# Patient Record
Sex: MALE | Race: WHITE | Employment: FULL TIME
[De-identification: names, ages, dates, MRNs, and addresses within clinical notes are randomized per-mention and may not be internally consistent; named-entity substitution may affect disease eponyms.]

---

## 2023-05-21 ENCOUNTER — HEALTH MAINTENANCE LETTER (OUTPATIENT)
Age: 28
End: 2023-05-21

## 2024-12-17 ENCOUNTER — HOSPITAL ENCOUNTER (EMERGENCY)
Facility: CLINIC | Age: 29
Discharge: HOME | End: 2024-12-17
Attending: EMERGENCY MEDICINE
Payer: COMMERCIAL

## 2024-12-17 ENCOUNTER — APPOINTMENT (OUTPATIENT)
Dept: ULTRASOUND IMAGING | Facility: CLINIC | Age: 29
End: 2024-12-17
Attending: EMERGENCY MEDICINE
Payer: COMMERCIAL

## 2024-12-17 VITALS
SYSTOLIC BLOOD PRESSURE: 140 MMHG | DIASTOLIC BLOOD PRESSURE: 73 MMHG | WEIGHT: 245 LBS | BODY MASS INDEX: 33.18 KG/M2 | HEIGHT: 72 IN | TEMPERATURE: 97.9 F | HEART RATE: 109 BPM | RESPIRATION RATE: 16 BRPM | OXYGEN SATURATION: 97 %

## 2024-12-17 DIAGNOSIS — N50.89 TESTICULAR SWELLING, RIGHT: ICD-10-CM

## 2024-12-17 PROBLEM — J30.2 ALLERGIC RHINITIS, SEASONAL: Status: ACTIVE | Noted: 2024-12-17

## 2024-12-17 LAB
ALBUMIN UR-MCNC: 10 MG/DL
APPEARANCE UR: CLEAR
BACTERIA #/AREA URNS HPF: ABNORMAL /HPF
BILIRUB UR QL STRIP: NEGATIVE
COLOR UR AUTO: YELLOW
GLUCOSE UR STRIP-MCNC: NEGATIVE MG/DL
HGB UR QL STRIP: NEGATIVE
KETONES UR STRIP-MCNC: NEGATIVE MG/DL
LEUKOCYTE ESTERASE UR QL STRIP: NEGATIVE
MUCOUS THREADS #/AREA URNS LPF: PRESENT /LPF
NITRATE UR QL: NEGATIVE
PH UR STRIP: 6 [PH] (ref 5–7)
RBC URINE: 0 /HPF
SP GR UR STRIP: 1.03 (ref 1–1.03)
T VAGINALIS DNA SPEC QL NAA+PROBE: NOT DETECTED
UROBILINOGEN UR STRIP-MCNC: <2 MG/DL
WBC URINE: 1 /HPF

## 2024-12-17 PROCEDURE — 250N000011 HC RX IP 250 OP 636: Performed by: EMERGENCY MEDICINE

## 2024-12-17 PROCEDURE — 99285 EMERGENCY DEPT VISIT HI MDM: CPT | Mod: 25

## 2024-12-17 PROCEDURE — 76870 US EXAM SCROTUM: CPT

## 2024-12-17 PROCEDURE — 250N000013 HC RX MED GY IP 250 OP 250 PS 637: Performed by: EMERGENCY MEDICINE

## 2024-12-17 PROCEDURE — 87491 CHLMYD TRACH DNA AMP PROBE: CPT | Performed by: EMERGENCY MEDICINE

## 2024-12-17 PROCEDURE — 87661 TRICHOMONAS VAGINALIS AMPLIF: CPT | Performed by: EMERGENCY MEDICINE

## 2024-12-17 PROCEDURE — 81001 URINALYSIS AUTO W/SCOPE: CPT | Performed by: EMERGENCY MEDICINE

## 2024-12-17 PROCEDURE — 93976 VASCULAR STUDY: CPT

## 2024-12-17 RX ORDER — OXYCODONE HYDROCHLORIDE 5 MG/1
5 TABLET ORAL ONCE
Status: COMPLETED | OUTPATIENT
Start: 2024-12-17 | End: 2024-12-17

## 2024-12-17 RX ORDER — LEVOFLOXACIN 750 MG/1
750 TABLET, FILM COATED ORAL ONCE
Status: COMPLETED | OUTPATIENT
Start: 2024-12-17 | End: 2024-12-17

## 2024-12-17 RX ORDER — IBUPROFEN 600 MG/1
600 TABLET, FILM COATED ORAL ONCE
Status: COMPLETED | OUTPATIENT
Start: 2024-12-17 | End: 2024-12-17

## 2024-12-17 RX ORDER — LEVOFLOXACIN 750 MG/1
750 TABLET, FILM COATED ORAL DAILY
Qty: 10 TABLET | Refills: 0 | Status: SHIPPED | OUTPATIENT
Start: 2024-12-17 | End: 2024-12-27

## 2024-12-17 RX ORDER — ONDANSETRON 4 MG/1
4 TABLET, ORALLY DISINTEGRATING ORAL EVERY 6 HOURS PRN
Qty: 10 TABLET | Refills: 0 | Status: SHIPPED | OUTPATIENT
Start: 2024-12-17

## 2024-12-17 RX ORDER — ONDANSETRON 4 MG/1
4 TABLET, ORALLY DISINTEGRATING ORAL ONCE
Status: COMPLETED | OUTPATIENT
Start: 2024-12-17 | End: 2024-12-17

## 2024-12-17 RX ADMIN — IBUPROFEN 600 MG: 600 TABLET ORAL at 14:17

## 2024-12-17 RX ADMIN — ONDANSETRON 4 MG: 4 TABLET, ORALLY DISINTEGRATING ORAL at 14:16

## 2024-12-17 RX ADMIN — LEVOFLOXACIN 750 MG: 750 TABLET, FILM COATED ORAL at 18:12

## 2024-12-17 RX ADMIN — OXYCODONE 5 MG: 5 TABLET ORAL at 16:24

## 2024-12-17 ASSESSMENT — ACTIVITIES OF DAILY LIVING (ADL): ADLS_ACUITY_SCORE: 41

## 2024-12-17 ASSESSMENT — COLUMBIA-SUICIDE SEVERITY RATING SCALE - C-SSRS
1. IN THE PAST MONTH, HAVE YOU WISHED YOU WERE DEAD OR WISHED YOU COULD GO TO SLEEP AND NOT WAKE UP?: NO
2. HAVE YOU ACTUALLY HAD ANY THOUGHTS OF KILLING YOURSELF IN THE PAST MONTH?: NO
6. HAVE YOU EVER DONE ANYTHING, STARTED TO DO ANYTHING, OR PREPARED TO DO ANYTHING TO END YOUR LIFE?: NO

## 2024-12-17 NOTE — ED PROVIDER NOTES
EMERGENCY DEPARTMENT ENCOUNTER      NAME: Steve Santana  AGE: 29 year old male  YOB: 1995  MRN: [id number]  EVALUATION DATE & TIME: No admission date for patient encounter.    PCP: Angel Kaur    ED PROVIDER: Isaías Maloney M.D.      Chief Complaint   Patient presents with    Groin Swelling         FINAL IMPRESSION:  1. Testicular swelling, right          ED COURSE & MEDICAL DECISION MAKING:    Pertinent Labs & Imaging studies reviewed below.  All EKGs below represent my independent interpretation.   ED Course as of 12/17/24 2243   Tue Dec 17, 2024   1446 Patient is otherwise healthy 29-year-old gentleman here with wife, brought in for tenderness, swelling in the right testicle without trauma for the past 4 days.  He is currently intermittently nauseous.  Denies any concern for STI, no discharge or rash.  On exam he has swollen, tender but not erythematous right testicle.  He is intermittently nauseous throughout the exam.  Concern for torsion, infection and ultrasound was ordered.  Motrin, Zofran ordered.  UA STI screening also ordered   1507 US Testicular & Scrotum w Doppler Ltd  Enlarged and heterogeneous right testicle containing complex intratesticular collections that measure up to 4.5 cm. Appearance is worrisome for cystic and solid neoplasm, less likely orchitis and abscess given the minimal hyperemia. Recommend urology consultation.   1521 Urology to be paged   1521 UA with Microscopic reflex to Culture(!)  Not concerning for UTI   1615 We have been trying to get a hold of the [place] [facility] for the past hour, but unfortunately the answering service is not able to be contacted and our Donna remains on hold for almost an hour.   1717 We were able to get through and I spoke to Dr. Loza.  We discussed his clinical presentation, ultrasound findings. Unable to clearly differentiate between abscess and neoplasm (although sudden onset of swelling and pain is more concerning for  infection clinically), but he will see the pt in [place] tomorrow. OK with levaquin for the time being. Pt was given the first dose here.      Additional ED Course timestamps entered by medical scribe:  2:00 PM I met with the patient for an initial encounter and evaluation.  3:23 PM I rechecked and updated the patient on  UA results.     Medical Decision Making  Obtained supplemental history:Supplemental history obtained?: Documented in chart  Reviewed external records: External records reviewed?: No  Care impacted by chronic illness:Documented in Chart  Care significantly affected by social determinants of health:N/A  Did you consider but not order tests?: Work up considered but not performed and documented in chart, if applicable  Did you interpret images independently?: Independent interpretation of ECG and images noted in documentation, when applicable.  Consultation discussion with other provider: Phone conversation with consultants will be documented in the ED Course  Discharge. I prescribed additional prescription strength medication(s) as charted. I considered admission, but discharged patient after significant clinical improvement.    MIPS Criteria Documentation: Not Applicable    MEDICATIONS GIVEN IN THE EMERGENCY:  Medications   ibuprofen (ADVIL/MOTRIN) tablet 600 mg (600 mg Oral $Given 12/17/24 1417)   ondansetron (ZOFRAN ODT) ODT tab 4 mg (4 mg Oral $Given 12/17/24 1416)   oxyCODONE (ROXICODONE) tablet 5 mg (5 mg Oral $Given 12/17/24 1624)   levofloxacin (LEVAQUIN) tablet 750 mg (750 mg Oral $Given 12/17/24 1812)         NEW PRESCRIPTIONS STARTED AT TODAY'S ER VISIT  Discharge Medication List as of 12/17/2024  6:05 PM        START taking these medications    Details   levofloxacin (LEVAQUIN) 750 MG tablet Take 1 tablet (750 mg) by mouth daily for 10 days., Disp-10 tablet, R-0, E-Prescribe      ondansetron (ZOFRAN ODT) 4 MG ODT tab Take 1 tablet (4 mg) by mouth every 6 hours as needed for nausea.,  "Disp-10 tablet, R-0, E-Prescribe                =================================================================    HPI    Steve D Max is a 29 year old male who presents to this ED for evaluation of right testicle \"soreness\" which abruptly occurred on 12/13/24 (~4 days ago). He developed swelling the next day and his pain worsened since. He now endorses nausea, vomiting, and lower abdominal pelvic pain. Patient denies history of testicular changes, recent trauma, penile rash or discharge, or unprotected sex. No other medical concerns are expressed at this time.     VITALS:  BP (!) 140/73   Pulse 109   Temp 97.9  F (36.6  C) (Oral)   Resp 16   Ht 1.829 m (6')   Wt 111.1 kg (245 lb)   SpO2 97%   BMI 33.23 kg/m      PHYSICAL EXAM    Constitutional: Well developed, well nourished. Uncomfortable appearing.  HENT: Normocephalic, atraumatic, mucous membranes moist, nose normal  Eyes: PERRL, EOMI, conjunctiva normal, no discharge.  Neck- Supple, gross ROM intact.   Respiratory: Normal work of breathing, normal rate, speaks in full sentences  Cardiovascular: Normal heart rate  Musculoskeletal: Moving all 4 extremities intentionally and without pain.  : Enlarged tender testicle on right, mildly erythematous but not warm.  Neurologic: Alert & oriented x 3, cranial nerves grossly intact. Normal gross coordination  Psychiatric: Affect normal, cooperative.      I, Fan Laron am serving as a scribe to document services personally performed by Dr. Isaías Maloney based on my observation and the provider's statements to me. I, Isaías Maloney MD attest that Fan Larry is acting in a scribe capacity, has observed my performance of the services and has documented them in accordance with my direction.    Isaías Maloney M.D.  Emergency Medicine  [facility] EMERGENCY ROOM  [address] [address] [phone]  Dept: " [phone]       Isaías Maloney MD  12/17/24 [id number]

## 2024-12-17 NOTE — ED TRIAGE NOTES
pt has had right testicular swelling since last Friday.  Vomiting started today.  Denies injury

## 2024-12-18 LAB
C TRACH DNA SPEC QL PROBE+SIG AMP: NEGATIVE
N GONORRHOEA DNA SPEC QL NAA+PROBE: NEGATIVE

## 2024-12-18 NOTE — DISCHARGE INSTRUCTIONS
Based on the ultrasound, it is not clear if this is an infection or abscess in the testicle, or cancer.  I know that is difficult to not have any certainty at this point, but my initial impression is that this is more likely to be an infection.  Dr. Loza will help further clarify this tomorrow in clinic.    The urologist Dr. Loza will have somebody call you tomorrow to fit you into his clinic in [place].    Please take your next dose of Levaquin tomorrow night.    The pain you are experiencing has a large inflammatory component to it, so anti-inflammatories will be very helpful. Please take: 600 mg of ibuprofen AND 1000 mg of tylenol three times a day. It is ok to take these medications at the same time. After 2-3 days, please take the medications only as needed.

## 2024-12-26 PROCEDURE — 88342 IMHCHEM/IMCYTCHM 1ST ANTB: CPT | Mod: 26 | Performed by: PATHOLOGY

## 2024-12-26 PROCEDURE — 88309 TISSUE EXAM BY PATHOLOGIST: CPT | Mod: 26 | Performed by: PATHOLOGY

## 2024-12-26 PROCEDURE — 88342 IMHCHEM/IMCYTCHM 1ST ANTB: CPT | Mod: TC,ORL | Performed by: STUDENT IN AN ORGANIZED HEALTH CARE EDUCATION/TRAINING PROGRAM

## 2024-12-26 PROCEDURE — 88341 IMHCHEM/IMCYTCHM EA ADD ANTB: CPT | Mod: TC,ORL | Performed by: STUDENT IN AN ORGANIZED HEALTH CARE EDUCATION/TRAINING PROGRAM

## 2024-12-26 PROCEDURE — 88341 IMHCHEM/IMCYTCHM EA ADD ANTB: CPT | Mod: 26 | Performed by: PATHOLOGY

## 2024-12-27 ENCOUNTER — LAB REQUISITION (OUTPATIENT)
Dept: LAB | Facility: CLINIC | Age: 29
End: 2024-12-27
Payer: COMMERCIAL

## 2024-12-27 DIAGNOSIS — N50.89 OTHER SPECIFIED DISORDERS OF THE MALE GENITAL ORGANS: ICD-10-CM

## 2025-01-03 LAB
PATH REPORT.COMMENTS IMP SPEC: ABNORMAL
PATH REPORT.COMMENTS IMP SPEC: YES
PATH REPORT.FINAL DX SPEC: ABNORMAL
PATH REPORT.GROSS SPEC: ABNORMAL
PATH REPORT.MICROSCOPIC SPEC OTHER STN: ABNORMAL
PATH REPORT.RELEVANT HX SPEC: ABNORMAL
PATHOLOGY SYNOPTIC REPORT: ABNORMAL
PHOTO IMAGE: ABNORMAL

## 2025-01-10 ENCOUNTER — TELEPHONE (OUTPATIENT)
Dept: PULMONOLOGY | Facility: CLINIC | Age: 30
End: 2025-01-10
Payer: COMMERCIAL

## 2025-01-10 ENCOUNTER — MYC MEDICAL ADVICE (OUTPATIENT)
Dept: INTERVENTIONAL RADIOLOGY/VASCULAR | Facility: CLINIC | Age: 30
End: 2025-01-10
Payer: COMMERCIAL

## 2025-01-10 RX ORDER — HEPARIN SODIUM 200 [USP'U]/100ML
1 INJECTION, SOLUTION INTRAVENOUS EVERY 5 MIN PRN
OUTPATIENT
Start: 2025-01-10

## 2025-01-10 NOTE — TELEPHONE ENCOUNTER
received referral from [facility] to schedule VQ lung scan. LM for pt to call back and schedule.

## 2025-01-13 NOTE — TELEPHONE ENCOUNTER
Patient returning call for scheduling. Unable to locate the referral and test type that is needed for the patient. Patient would like a call back to advise on this. Thank you.

## 2025-01-14 ENCOUNTER — TELEPHONE (OUTPATIENT)
Dept: PULMONOLOGY | Facility: CLINIC | Age: 30
End: 2025-01-14

## 2025-01-14 ENCOUNTER — TRANSCRIBE ORDERS (OUTPATIENT)
Dept: OTHER | Age: 30
End: 2025-01-14

## 2025-01-14 ENCOUNTER — MEDICAL CORRESPONDENCE (OUTPATIENT)
Dept: HEALTH INFORMATION MANAGEMENT | Facility: CLINIC | Age: 30
End: 2025-01-14

## 2025-01-14 ENCOUNTER — HOSPITAL ENCOUNTER (OUTPATIENT)
Dept: CARDIOLOGY | Facility: HOSPITAL | Age: 30
Discharge: HOME | End: 2025-01-14
Attending: INTERNAL MEDICINE
Payer: COMMERCIAL

## 2025-01-14 DIAGNOSIS — C62.90 TESTICULAR CANCER (H): Primary | ICD-10-CM

## 2025-01-14 DIAGNOSIS — Z01.818 EXAMINATION PRIOR TO CHEMOTHERAPY: ICD-10-CM

## 2025-01-14 LAB — LVEF ECHO: NORMAL

## 2025-01-14 PROCEDURE — 93356 MYOCRD STRAIN IMG SPCKL TRCK: CPT | Performed by: INTERNAL MEDICINE

## 2025-01-14 PROCEDURE — 93306 TTE W/DOPPLER COMPLETE: CPT | Mod: 26 | Performed by: INTERNAL MEDICINE

## 2025-01-14 PROCEDURE — 93356 MYOCRD STRAIN IMG SPCKL TRCK: CPT

## 2025-01-14 NOTE — TELEPHONE ENCOUNTER
Patient is calling back. Patient states he has not heard anything back from the clinic. Patient is wanting to get a call back ASAP and left a detailed message if not answered with a direct number to reach the nurse. Patient states this is the last thing he is needing to schedule to be able to start his Chemo therapy. Patient states he is going into an appt for an EKG at 9:00am. Please advise.

## 2025-01-14 NOTE — TELEPHONE ENCOUNTER
Called and spoke with patient.  We do not schedule VG scans as OP procedures unless patient being seen by one of our pulmonologists.  Orders were faxed back to [facility] yesterday.  Informed patient.  He will touch base with [facility] regarding the scheduling of this imaging.

## 2025-01-14 NOTE — TELEPHONE ENCOUNTER
Faxed order from [facility] for VQ scan back to [facility] and sent order to central scheduling to call patient and schedule.

## 2025-01-15 NOTE — TELEPHONE ENCOUNTER
Writer has spoken with Steve regarding planned procedure with IR via telephone.      Steve acknowledges understanding of pre-procedure instructions.         I have provided Steve with IR number  for questions or concerns.    A documented H&P exam is noted in patient EMR with the date Del[place].  Provider name 1/13/25.    Jackie PORTILLO RN, BSN  Interventional Radiology

## 2025-01-15 NOTE — PROGRESS NOTES
"  Interventional Radiology - Pre-Procedure Note:  [facility]  01/17/2025     Procedure Requested: Port Placement, No laterality delineated on order request.  Requested by: Fabio Kim MD     History and Physical Reviewed: H&P documented within 30 days (by Isaías Maloney MD  on 12/17/25). I have personally reviewed the patient's medical history and have updated the medical record as necessary.    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 Port placement requested.       NPO: Midnight.  ANTICOAGULANTS: None.  ANTIBIOTICS: Ancef ordered for procedure...    ALLERGIES  No Known Allergies      LABS:  No results found for: \"INR\"   No results found for: \"HGB\"  No results found for: \"PLT\"  No results found for: \"CR\"  No results found for: \"POTASSIUM\"      EXAM:  BP (!) 145/89   Pulse 82   Temp 98.5  F (36.9  C)   Resp 16   SpO2 96%   General: Stable. In no acute distress.    Neuro: Alert and oriented x 3. Speech clear. No focal deficits.  Psych: Appropriate mood and affect. Cooperative. Answering questions appropriately.  Resp: Normal respirations. Unlabored breathing. Lungs clear to auscultation bilaterally.  Cardio: S1S2, regular rate and rhythm, without murmur, clicks or rubs  Skin: Warm and dry. Without excoriations, ecchymosis, erythema, lesions or open sores on upper chest and neck.      Pre-Sedation Assessment:  Mallampati Airway Classification:  II - Faucial pillars and soft palate may be seen, but uvula is masked by the base of the tongue  Previous reaction to anesthesia/sedation:  No  Sedation plan based on assessment: Moderate (conscious) sedation  ASA Classification: Class 1 - HEALTHY PATIENT   Code Status: Full Code intra procedure, per discussion with patient.         ASSESSMENT/PLAN:   #Testicular cancer    Port " placement with sedation.    Procedural education reviewed with patient/family in detail including, but not limited to risks, benefits and alternatives with understanding verbalized by patient/family.    Through chart review and discussion with patient there is no contraindication for right chest port placement. Anticipating likely right chest port placement, though ultimately laterality to be determined by interventionalist.     Total time spent on the date of the encounter: 45 minutes.      CHARLEY LUI CNP  Interventional Radiology

## 2025-01-17 ENCOUNTER — HOSPITAL ENCOUNTER (OUTPATIENT)
Dept: INTERVENTIONAL RADIOLOGY/VASCULAR | Facility: HOSPITAL | Age: 30
Discharge: HOME | End: 2025-01-17
Attending: INTERNAL MEDICINE | Admitting: RADIOLOGY
Payer: COMMERCIAL

## 2025-01-17 VITALS
SYSTOLIC BLOOD PRESSURE: 148 MMHG | HEART RATE: 86 BPM | RESPIRATION RATE: 16 BRPM | OXYGEN SATURATION: 97 % | TEMPERATURE: 98 F | DIASTOLIC BLOOD PRESSURE: 74 MMHG

## 2025-01-17 DIAGNOSIS — C62.90 TESTICULAR CANCER (H): ICD-10-CM

## 2025-01-17 PROCEDURE — C1788 PORT, INDWELLING, IMP: HCPCS

## 2025-01-17 PROCEDURE — 99152 MOD SED SAME PHYS/QHP 5/>YRS: CPT

## 2025-01-17 PROCEDURE — 272N000500 HC NEEDLE CR2

## 2025-01-17 PROCEDURE — C1769 GUIDE WIRE: HCPCS

## 2025-01-17 PROCEDURE — 250N000011 HC RX IP 250 OP 636: Performed by: NURSE PRACTITIONER

## 2025-01-17 PROCEDURE — 250N000009 HC RX 250: Performed by: NURSE PRACTITIONER

## 2025-01-17 RX ORDER — CEFAZOLIN SODIUM/WATER 2 G/20 ML
2 SYRINGE (ML) INTRAVENOUS
Status: COMPLETED | OUTPATIENT
Start: 2025-01-17 | End: 2025-01-17

## 2025-01-17 RX ORDER — NALOXONE HYDROCHLORIDE 0.4 MG/ML
0.2 INJECTION, SOLUTION INTRAMUSCULAR; INTRAVENOUS; SUBCUTANEOUS
Status: DISCONTINUED | OUTPATIENT
Start: 2025-01-17 | End: 2025-01-18 | Stop reason: HOSPADM

## 2025-01-17 RX ORDER — FENTANYL CITRATE 50 UG/ML
25-50 INJECTION, SOLUTION INTRAMUSCULAR; INTRAVENOUS EVERY 5 MIN PRN
Status: DISCONTINUED | OUTPATIENT
Start: 2025-01-17 | End: 2025-01-18 | Stop reason: HOSPADM

## 2025-01-17 RX ORDER — ALBUTEROL SULFATE 0.83 MG/ML
2.5 SOLUTION RESPIRATORY (INHALATION) ONCE
Status: COMPLETED | OUTPATIENT
Start: 2025-01-20 | End: 2025-01-20

## 2025-01-17 RX ORDER — HEPARIN SODIUM,PORCINE 10 UNIT/ML
5-10 VIAL (ML) INTRAVENOUS EVERY 24 HOURS
OUTPATIENT
Start: 2025-01-17

## 2025-01-17 RX ORDER — NALOXONE HYDROCHLORIDE 0.4 MG/ML
0.4 INJECTION, SOLUTION INTRAMUSCULAR; INTRAVENOUS; SUBCUTANEOUS
Status: DISCONTINUED | OUTPATIENT
Start: 2025-01-17 | End: 2025-01-18 | Stop reason: HOSPADM

## 2025-01-17 RX ORDER — ACETAMINOPHEN 325 MG/1
650 TABLET ORAL
OUTPATIENT
Start: 2025-01-17

## 2025-01-17 RX ORDER — LIDOCAINE 40 MG/G
CREAM TOPICAL
Status: DISCONTINUED | OUTPATIENT
Start: 2025-01-17 | End: 2025-01-18 | Stop reason: HOSPADM

## 2025-01-17 RX ORDER — FLUMAZENIL 0.1 MG/ML
0.2 INJECTION, SOLUTION INTRAVENOUS
Status: DISCONTINUED | OUTPATIENT
Start: 2025-01-17 | End: 2025-01-18 | Stop reason: HOSPADM

## 2025-01-17 RX ORDER — HEPARIN SODIUM,PORCINE 10 UNIT/ML
5-10 VIAL (ML) INTRAVENOUS
OUTPATIENT
Start: 2025-01-17

## 2025-01-17 RX ORDER — HEPARIN SODIUM (PORCINE) LOCK FLUSH IV SOLN 100 UNIT/ML 100 UNIT/ML
5-10 SOLUTION INTRAVENOUS
OUTPATIENT
Start: 2025-01-17

## 2025-01-17 RX ORDER — ONDANSETRON 2 MG/ML
4 INJECTION INTRAMUSCULAR; INTRAVENOUS
Status: DISCONTINUED | OUTPATIENT
Start: 2025-01-17 | End: 2025-01-18 | Stop reason: HOSPADM

## 2025-01-17 RX ORDER — HEPARIN SODIUM (PORCINE) LOCK FLUSH IV SOLN 100 UNIT/ML 100 UNIT/ML
500 SOLUTION INTRAVENOUS ONCE
Status: COMPLETED | OUTPATIENT
Start: 2025-01-17 | End: 2025-01-17

## 2025-01-17 RX ADMIN — MIDAZOLAM HYDROCHLORIDE 0.5 MG: 1 INJECTION, SOLUTION INTRAMUSCULAR; INTRAVENOUS at 14:22

## 2025-01-17 RX ADMIN — HEPARIN SODIUM (PORCINE) LOCK FLUSH IV SOLN 100 UNIT/ML 500 UNITS: 100 SOLUTION at 14:27

## 2025-01-17 RX ADMIN — MIDAZOLAM HYDROCHLORIDE 1 MG: 1 INJECTION, SOLUTION INTRAMUSCULAR; INTRAVENOUS at 14:11

## 2025-01-17 RX ADMIN — MIDAZOLAM HYDROCHLORIDE 0.5 MG: 1 INJECTION, SOLUTION INTRAMUSCULAR; INTRAVENOUS at 14:18

## 2025-01-17 RX ADMIN — LIDOCAINE HYDROCHLORIDE 1 ML: 10 INJECTION, SOLUTION INFILTRATION; PERINEURAL at 14:23

## 2025-01-17 RX ADMIN — FENTANYL CITRATE 50 MCG: 50 INJECTION, SOLUTION INTRAMUSCULAR; INTRAVENOUS at 14:15

## 2025-01-17 RX ADMIN — CEFAZOLIN 2 G: 10 INJECTION, POWDER, FOR SOLUTION INTRAVENOUS at 14:03

## 2025-01-17 RX ADMIN — FENTANYL CITRATE 50 MCG: 50 INJECTION, SOLUTION INTRAMUSCULAR; INTRAVENOUS at 14:20

## 2025-01-17 NOTE — PROCEDURES
[facility]    Procedure: Right chest port placement    Date/Time: 1/17/2025 2:50 PM    Performed by: Tristan Cruz MD  Authorized by: Tristan Cruz MD  IR Fellow Physician:    Pre Procedure Diagnosis: Testicular tumor  Post Procedure Diagnosis: Testicular tumor    UNIVERSAL PROTOCOL   Site Marked: NA  Prior Images Obtained and Reviewed:  Yes  Required items: Required blood products, implants, devices and special equipment available    Patient identity confirmed:  Arm band, provided demographic data, hospital-assigned identification number and verbally with patient  Patient was reevaluated immediately before administering moderate or deep sedation or anesthesia  Confirmation Checklist:  Correct equipment/implants were available, procedure was appropriate and matched the consent or emergent situation, relevant allergies and patient's identity using two indicators  Time out: Immediately prior to the procedure a time out was called    Universal Protocol: the Joint Commission Universal Protocol was followed    Preparation: Patient was prepped and draped in usual sterile fashion       ANESTHESIA    Anesthesia:  Local infiltration  Local Anesthetic:  Lidocaine 1% without epinephrine      SEDATION  Patient Sedated: Yes    Sedation Type:  Moderate (conscious) sedation  Sedation:  Fentanyl and midazolam  Vital signs: Vital signs monitored during sedation    See dictated procedure note for full details.  Findings: Patient tolerated the procedure without issue.    Specimens: none    Procedural Complications: None    Condition: Stable    Plan: Port ready for immediate use. Patient may discharge in 1 hour if criteria met.      PROCEDURE  Describe Procedure: Right chest SmartPort placed without issue.  Patient Tolerance:  Patient tolerated the procedure well with no immediate complications  Length of time physician/provider present for 1:1 monitoring during sedation:  23-37 min

## 2025-01-17 NOTE — PRE-PROCEDURE
GENERAL PRE-PROCEDURE:   Procedure:  Port placement with sedation  Date/Time:  1/17/2025 1:45 PM    Written consent obtained?: Yes    Risks and benefits: Risks, benefits and alternatives were discussed    Consent given by:  Patient  Patient states understanding of procedure being performed: Yes    Patient's understanding of procedure matches consent: Yes    Procedure consent matches procedure scheduled: Yes    Expected level of sedation:  Moderate  Appropriately NPO:  Yes  ASA Class:  1  Mallampati  :  Grade 2- soft palate, base of uvula, tonsillar pillars, and portion of posterior pharyngeal wall visible  Lungs:  Lungs clear with good breath sounds bilaterally  Heart:  Normal heart sounds and rate  History & Physical reviewed:  History and physical reviewed and no updates needed  Statement of review:  I have reviewed the lab findings, diagnostic data, medications, and the plan for sedation

## 2025-01-17 NOTE — DISCHARGE INSTRUCTIONS
Port Placement Procedure Discharge Instructions:  You had a port placed. A port is a small medical device that is placed under the skin and is connected to a vein with a catheter (thin, flexible tube). Ports can be used to administer IV medications (including chemotherapy), fluids or blood products or for blood lab draws. Please follow the below instructions after your procedure:    Care Instructions:  - If you received sedation for your procedure, do not drive or operate heavy machinery for the rest of the day.  - You may shower beginning tomorrow (post procedure day #1). Do not scrub site until well healed; pat dry gently with a towel.  - You likely have skin adhesive over your port site. Skin adhesive works like a bandage to keep the site covered and protected. Do not use antibiotic ointment or creams/lotions over adhesive as it can break it down. The skin adhesive will peel off on its own (typically in 5-14 days).  - Avoid submerging the port site under water (ex: tub baths, Jacuzzis, lakes, hot tubs and pools) for 10 days or until your site is well healed.  - You may have some discomfort, minimal swelling, redness and/or bruising at your port site/procedure site. You may take over the counter pain medication for discomfort (follow the package directions) or apply an ice pack wrapped in a towel over the site (rotating 20 minutes with ice pack on and 20 minutes with ice pack off) for comfort as needed. It can take several days for these to resolve.  - Avoid heavy lifting (greater than 10 pounds) and strenuous activities for 2 days following your procedure.   - If you experience significant bleeding at site, apply pressure with hands above the clavicle bone, sit upright and seek immediate medical assistance.  - Ports need to be flushed approximately every 4-6 weeks, if not being used more frequently. Follow up with the provider who ordered your port placement for further instructions for this.    Seek medical  evaluation or contact Kyle IRELAND RN Line at [phone] if you experience the following:  - Uncontrolled bleeding from port site  - Fever (greater than 101 F (38.3C))  - Purulent (yellow/green/foul smelling) drainage from port insertion site  - Increasing pain at port site  - Increasing redness at port site

## 2025-01-17 NOTE — IR NOTE
Patient Name: Steve Santana  Medical Record Number: [id number]  Today's Date: 1/17/2025    Procedure: Port placement  Proceduralist: Nancy    Sedation medications administered: 2 mg midazolam and 100 mcg fentanyl   Sedation time: 30 minutes

## 2025-01-19 ENCOUNTER — HOSPITAL ENCOUNTER (OUTPATIENT)
Dept: MRI IMAGING | Facility: CLINIC | Age: 30
Discharge: HOME | End: 2025-01-19
Attending: INTERNAL MEDICINE | Admitting: INTERNAL MEDICINE
Payer: COMMERCIAL

## 2025-01-19 ENCOUNTER — HEALTH MAINTENANCE LETTER (OUTPATIENT)
Age: 30
End: 2025-01-19

## 2025-01-19 DIAGNOSIS — C62.11 MALIGNANT NEOPLASM OF DESCENDED RIGHT TESTIS (H): ICD-10-CM

## 2025-01-19 PROCEDURE — 255N000002 HC RX 255 OP 636: Performed by: INTERNAL MEDICINE

## 2025-01-19 PROCEDURE — 70553 MRI BRAIN STEM W/O & W/DYE: CPT

## 2025-01-19 PROCEDURE — A9585 GADOBUTROL INJECTION: HCPCS | Performed by: INTERNAL MEDICINE

## 2025-01-19 RX ORDER — GADOBUTROL 604.72 MG/ML
11 INJECTION INTRAVENOUS ONCE
Status: COMPLETED | OUTPATIENT
Start: 2025-01-19 | End: 2025-01-19

## 2025-01-19 RX ADMIN — GADOBUTROL 11 ML: 604.72 INJECTION INTRAVENOUS at 08:06

## 2025-01-20 ENCOUNTER — HOSPITAL ENCOUNTER (OUTPATIENT)
Dept: RESPIRATORY THERAPY | Facility: CLINIC | Age: 30
Discharge: HOME | End: 2025-01-20
Attending: INTERNAL MEDICINE | Admitting: INTERNAL MEDICINE
Payer: COMMERCIAL

## 2025-01-20 DIAGNOSIS — C62.90 TESTICULAR CANCER (H): Primary | ICD-10-CM

## 2025-01-20 LAB
DLCOCOR-%PRED-PRE: 94 %
DLCOCOR-PRE: 33.22 ML/MIN/MMHG
DLCOUNC-%PRED-PRE: 96 %
DLCOUNC-PRE: 33.85 ML/MIN/MMHG
DLCOUNC-PRED: 35.03 ML/MIN/MMHG
ERV-%PRED-PRE: 38 %
ERV-PRE: 0.75 L
ERV-PRED: 1.93 L
EXPTIME-PRE: 7.12 SEC
FEF2575-%PRED-POST: 94 %
FEF2575-%PRED-PRE: 56 %
FEF2575-POST: 4.33 L/SEC
FEF2575-PRE: 2.59 L/SEC
FEF2575-PRED: 4.6 L/SEC
FEFMAX-%PRED-PRE: 66 %
FEFMAX-PRE: 7.1 L/SEC
FEFMAX-PRED: 10.71 L/SEC
FEV1-%PRED-PRE: 74 %
FEV1-PRE: 3.34 L
FEV1FEV6-PRE: 74 %
FEV1FEV6-PRED: 83 %
FEV1FVC-PRE: 74 %
FEV1FVC-PRED: 84 %
FEV1SVC-PRE: 71 %
FEV1SVC-PRED: 81 %
FIFMAX-PRE: 4.81 L/SEC
FRCPLETH-%PRED-PRE: 98 %
FRCPLETH-PRE: 3.55 L
FRCPLETH-PRED: 3.6 L
FVC-%PRED-PRE: 84 %
FVC-PRE: 4.53 L
FVC-PRED: 5.39 L
HGB BLD-MCNC: 15.3 G/DL (ref 13.3–17.7)
IC-%PRED-PRE: 101 %
IC-PRE: 3.92 L
IC-PRED: 3.85 L
RVPLETH-%PRED-PRE: 155 %
RVPLETH-PRE: 2.8 L
RVPLETH-PRED: 1.8 L
TLCPLETH-%PRED-PRE: 99 %
TLCPLETH-PRE: 7.47 L
TLCPLETH-PRED: 7.53 L
VA-%PRED-PRE: 90 %
VA-PRE: 6.43 L
VC-%PRED-PRE: 83 %
VC-PRE: 4.67 L
VC-PRED: 5.56 L

## 2025-01-20 PROCEDURE — 85018 HEMOGLOBIN: CPT | Performed by: INTERNAL MEDICINE

## 2025-01-20 PROCEDURE — 36415 COLL VENOUS BLD VENIPUNCTURE: CPT | Performed by: INTERNAL MEDICINE

## 2025-01-20 PROCEDURE — 94726 PLETHYSMOGRAPHY LUNG VOLUMES: CPT

## 2025-01-20 PROCEDURE — 94729 DIFFUSING CAPACITY: CPT

## 2025-01-20 PROCEDURE — 999N000157 HC STATISTIC RCP TIME EA 10 MIN

## 2025-01-20 PROCEDURE — 250N000009 HC RX 250: Performed by: INTERNAL MEDICINE

## 2025-01-20 PROCEDURE — 94060 EVALUATION OF WHEEZING: CPT

## 2025-01-20 RX ADMIN — ALBUTEROL SULFATE 2.5 MG: 2.5 SOLUTION RESPIRATORY (INHALATION) at 07:34

## 2025-01-20 NOTE — PROGRESS NOTES
RESPIRATORY CARE NOTE    Complete Pulmonary Function Test completed by patient.  Good patient effort and cooperation. Albuterol 2.5 mg neb given for bronchodilation.  The results of this test meet the ATS standards for acceptability and repeatability. PT returned to baseline and left in no distress.    Araceli Morrissey, RT  1/20/2025

## (undated) RX ORDER — CEFAZOLIN SODIUM/WATER 2 G/20 ML
SYRINGE (ML) INTRAVENOUS
Status: DISPENSED
Start: 2025-01-17

## (undated) RX ORDER — FENTANYL CITRATE 50 UG/ML
INJECTION, SOLUTION INTRAMUSCULAR; INTRAVENOUS
Status: DISPENSED
Start: 2025-01-17